# Patient Record
Sex: FEMALE | URBAN - METROPOLITAN AREA
[De-identification: names, ages, dates, MRNs, and addresses within clinical notes are randomized per-mention and may not be internally consistent; named-entity substitution may affect disease eponyms.]

---

## 2022-09-19 ENCOUNTER — OFFICE VISIT (OUTPATIENT)
Dept: URBAN - METROPOLITAN AREA CLINIC 3 | Facility: CLINIC | Age: 32
End: 2022-09-19
Payer: COMMERCIAL

## 2022-09-19 DIAGNOSIS — H18.623 KERATOCONUS, UNSTABLE, BILATERAL: Primary | ICD-10-CM

## 2022-09-19 PROCEDURE — 92025 CPTRIZED CORNEAL TOPOGRAPHY: CPT | Performed by: OPHTHALMOLOGY

## 2022-09-19 PROCEDURE — 92004 COMPRE OPH EXAM NEW PT 1/>: CPT | Performed by: OPHTHALMOLOGY

## 2022-09-19 ASSESSMENT — INTRAOCULAR PRESSURE
OD: 16
OS: 18

## 2022-09-19 ASSESSMENT — KERATOMETRY
OS: 46.00
OD: 46.50

## 2022-09-19 NOTE — IMPRESSION/PLAN
Impression: Keratoconus, unstable, bilateral: Y66.194. Plan: Unstable Keratoconus both eyes - Confirmed with baseline pentacam which shows posterior steepening. Patient c/o progressive worsening of vision despite new glasses and contacts. Recommended collagen cross linking in both eyes. RTC in 2 months for repeat refraction and topography to determine if ready to book collagen cross linking. PACH  PACH

## 2023-04-05 ENCOUNTER — OFFICE VISIT (OUTPATIENT)
Dept: URBAN - METROPOLITAN AREA CLINIC 3 | Facility: CLINIC | Age: 33
End: 2023-04-05
Payer: COMMERCIAL

## 2023-04-05 DIAGNOSIS — H52.223 REGULAR ASTIGMATISM, BILATERAL: ICD-10-CM

## 2023-04-05 DIAGNOSIS — H18.613 KERATOCONUS, STABLE, BILATERAL: Primary | ICD-10-CM

## 2023-04-05 DIAGNOSIS — H18.623 KERATOCONUS, UNSTABLE, BILATERAL: ICD-10-CM

## 2023-04-05 PROCEDURE — 92025 CPTRIZED CORNEAL TOPOGRAPHY: CPT | Performed by: OPHTHALMOLOGY

## 2023-04-05 PROCEDURE — 92012 INTRM OPH EXAM EST PATIENT: CPT | Performed by: OPHTHALMOLOGY

## 2023-04-05 ASSESSMENT — INTRAOCULAR PRESSURE
OD: 17
OS: 19

## 2023-04-05 ASSESSMENT — KERATOMETRY
OS: 46.13
OD: 46.75

## 2023-04-05 NOTE — IMPRESSION/PLAN
Impression: Keratoconus, stable, bilateral: H18.613. Plan: Repeat Pentacam and manifest refraction today shows no signs of progression. History of CXL OU in [facility] around 2013. Stressed the importance of no eye rubbing or sleeping face down.